# Patient Record
Sex: FEMALE | Race: WHITE | NOT HISPANIC OR LATINO | Employment: OTHER | URBAN - METROPOLITAN AREA
[De-identification: names, ages, dates, MRNs, and addresses within clinical notes are randomized per-mention and may not be internally consistent; named-entity substitution may affect disease eponyms.]

---

## 2019-05-20 NOTE — PATIENT DISCUSSION
New Prescription: prednisolone acetate (prednisolone acetate): drops,suspension: 1% 1 drop four times a day as directed 05-

## 2019-05-20 NOTE — PATIENT DISCUSSION
HX OF MAC HOLE OS - SOME RETINA THICKENING ON OCT TODAY. CALL AND GET CLEARANCE FROM DR. Rolando West. THE PT UNDERSTANDS THAT EVEN AFTER THE CATARACT IS REMOVED  THE VISION MAY ALWAYS BE LIMITED BY THE [place]. IF DR. ZIMMERMAN DOES NOT HAVE A RECENT VF   SCHEDULE A VISUAL FIELD NEXT VISIT TO CHECK FOR VISUAL FIELD LOSS.OCT NEXT VISIT TO CHECK FOR RNFL THINNING.

## 2019-05-20 NOTE — PATIENT DISCUSSION
EXOPTROPIA - ADV PT THAT HE WILL NEED TO BE RE-FIT FOR GLASSES WITH PRISMS TO CORRECT DEPTH PERCEPTION AFTER THE CATARACTS ARE REMOVED.

## 2019-05-20 NOTE — PATIENT DISCUSSION
I have discussed the options of monovision, multifocal and extended-depth-of-focus lens. It was explained that these options provide vision at near and distance; however, compromises to achieve vision at varying distances do exist and these compromises may result in a reduced quality of vision. It was explained to the patient that the visual performance and dependence on glasses varies from patient to patient and while others may perform activities without glasses there are some who need glasses for most or all of their activities. It was emphasized to the patient that monovision, multifocal and EDOF options do not eliminate the need for glasses at all times. It was emphasized to the patient that the goal of reducing spectacle dependence not spectacle freedom is more realistic. It was explained that they may and will possibly choose or need glasses to achieve optimal intermediate and binocular vision for distance and/or near activities.

## 2019-05-20 NOTE — PATIENT DISCUSSION
CATARACT OD - HOLD OFF ON SURGERY FOR NOW AND RE-EVALUATE LATER  IF VISUAL SYMPTOMS PERSIST.  GLS RX GIVEN TO FILL IF DESIRES IN THE EVENT PT DOES NOT PROCEED W/ SX.

## 2019-05-20 NOTE — PATIENT DISCUSSION
REFRACTIVE ERROR, OU - DISCUSSED OPTION OF CORRECTING AT THE TIME OF CATARACT SURGERY. PT UNDERSTANDS AND ELECTS TO CONSIDER THEIR OPTIONS. HE IS NOT A CANDIDATE FOR EDOF/MF IOLS, HE REQUIRES PRISM CORRECTION IN HIS GLASSES THAT CANNOT BE CORRECTED AT THE TIME OF CATARACT SURGERY.

## 2019-05-20 NOTE — PATIENT DISCUSSION
Monovision - Patient uses one eye to see at near and the other eye to see at distance. The patient has been a successful monovision patient or must demonstrate a successful mono-vision contact lens trial prior to cataract surgery. It was discussed that monovision is a compromise and vision will be limited during activities that require good depth perception to include driving at night and near tasks.

## 2019-05-20 NOTE — PATIENT DISCUSSION
Refractive Error - a problem focusing light accurately onto the retina due to the shape of the eye. The most common types of refractive error are near-sightedness; far-sightedness, astigmatism, and presbyopia. I have discussed the options for refractive surgery to decrease dependency on glasses and/or contact lenses after surgery. It was discussed that while the success rate of cataract surgery/ Refractive Lens Exchange (RLE) is high (approximately 99 percent), success being defined as removing a lens and replacing it with an artificial lens, the success rate of getting the prescription corrected to meet or approximate the patients goals (within 0.5 diopters) is lower than it is with LASIK (about 75-80%). These options include: correction of astigmatism with limbal relaxing incision(s), LenSx arcuate incision(s) and/or TORIC IOL, monovision, nanovision, multifocal IOL, and EDOF IOL.

## 2019-05-20 NOTE — PATIENT DISCUSSION
POAG OU - FOLLOWED BY DR. Lee Muñoz. REQUEST VISUAL FIELDS. POSSIBLE ISTENT. CONTINUE TIMOLOL 1 GTT QHS OU.

## 2019-05-20 NOTE — PATIENT DISCUSSION
It was discussed that the success rate of achieving their visual target with cataract surgery or a refractive lens exchange (RLE) is not as high as with LASIK and should the visual target not be achieved upon initial procedure, a glasses prescription and or a LASIK touch up maybe needed. The patient was advised that if monovision, a multifocal or extended-depth-of-focus lens does not meet his/her expectations and glasses provide an increased quality of vision, glasses would be prescribed and if a candidate, a LASIK touch up may be offered. However, if the lenses are limiting their vision or activities and the vision is neither correctable nor satisfactory with glasses, then an IOL exchange may be considered.

## 2019-05-20 NOTE — PATIENT DISCUSSION
"Multifocal versus Extended-depth-of-focus lens (EDOF) - It was explained that multifocal and extended depth of focus lenses split light and this can be associated with a decrease in contrast, a double image and ghosting which may or may not resolve over time. Multifocal lenses are lighting dependent. In low or dim lighting, glasses may be needed for optimal near vision. It was explained that rings, halos, starbursts or spider webs around point sources of light (headlights, tail lights, street lights, neon signs, the moon, stars, etc.) will be present indefinitely after multifocal/EDOF lens implantation. While the majority of patients do not find this limit's their night time activities to include driving, in rare instances, it can.  It was explained that these lenses are designed to satisfy a defined area of near vision, or ""sweet spot""

## 2019-05-20 NOTE — PATIENT DISCUSSION
Presbyopia is a gradual, age-related loss of the eye's ability to focus actively on nearby objects. The patient has expressed an interest in presbyopic correction.

## 2019-05-20 NOTE — PATIENT DISCUSSION
The patient was informed that the range of focus at near with mono-vision after cataract surgery will be smaller due to the smaller depth of field with an artificial lens when compared to their natural lens even in the setting of a cataract. It was also explained that the distance vision in the near eye may be blurrier when compared to the distance vision in the near eye with a natural lens. As such, a small percentage of successful monovision patients prior to surgery may experience difficulty with monovision with an artificial lens implant after surgery possibly requiring glasses and/or contacts or an IOL exchange.

## 2019-05-29 NOTE — PATIENT DISCUSSION
Continue: prednisolone acetate (prednisolone acetate): drops,suspension: 1% 1 drop four times a day as directed 05-

## 2019-07-19 NOTE — PATIENT DISCUSSION
PATIENT WILL BE SEEING DR WEBER I THE FUTURE. ADVISED IF ANY ISSUES WITH NEW GLASSES, RTC FOR RECHECK.   CONSIDER PRISM AND OR CATARACT SURGERY OD

## 2019-08-27 NOTE — PATIENT DISCUSSION
POAG OU - S/P iSTENT OS. FOLLOWED BY DR. Georgia Sánchez. REQUEST VISUAL FIELDS.  CONTINUE ON NO DROPS

## 2019-12-31 NOTE — PATIENT DISCUSSION
Selective Laser Trabeculoplasty Counseling: The patient's glaucoma diagnosis has been discussed and pathophysiology explained. Retinal nerve fiber analysis and visual fields reviewed along with IOP progression. R/B/A tx options discussed. The patient verbalizes understanding and wishes to proceed with SLT. Due to the risk of blurred vision and sensitivity after the procedure, a  is recommended. SLT will be scheduled at the patient's convenience.

## 2019-12-31 NOTE — PATIENT DISCUSSION
POAG OU - S/P iSTENT OS.  DISCUSSED VISUAL FIELDS WITH INFERIOR ARCUATE OS, ELEVATED IOP OD, RECOMMEND SLT OD THEN OS

## 2021-09-07 NOTE — PATIENT DISCUSSION
"0005:Pt is sitting up in bed, restless. Unable to communicate appropriately, is saying \"I want my mommy\" and \"ouch ouch.\" PRN Ativan and scheduled reglan administered at this time.   0015: Medications effective, pt lying in bed with extra blankets provided, calm and sleeping with unlabored breathing. Nasal canula removed by patient, maintaining O2 sat >94% at room air.   " STRABISMUS WITH ALTERNATING XT - NO DIPLOPIA ON CURRENT PRISM.  FOLLOW

## 2021-09-07 NOTE — PATIENT DISCUSSION
POAG OU - S/P iSTENT OS. PT NO LONGER SEEING DR ZIMMERMAN. IOP WNL CONTINUE ON NO DROPS. SCHEDULE VF PRIOR TO NEXT APPT.  RNFL AT F/U

## 2021-10-30 ENCOUNTER — NEW PATIENT (OUTPATIENT)
Dept: URBAN - METROPOLITAN AREA CLINIC 26 | Facility: CLINIC | Age: 66
End: 2021-10-30

## 2021-10-30 VITALS — HEIGHT: 68 IN | WEIGHT: 180 LBS | BODY MASS INDEX: 27.28 KG/M2

## 2021-10-30 DIAGNOSIS — H43.811: ICD-10-CM

## 2021-10-30 DIAGNOSIS — H43.391: ICD-10-CM

## 2021-10-30 DIAGNOSIS — H43.392: ICD-10-CM

## 2021-10-30 PROCEDURE — 92004 COMPRE OPH EXAM NEW PT 1/>: CPT

## 2021-10-30 PROCEDURE — 92250 FUNDUS PHOTOGRAPHY W/I&R: CPT

## 2021-10-30 ASSESSMENT — TONOMETRY
OD_IOP_MMHG: 14
OS_IOP_MMHG: 14

## 2021-10-30 ASSESSMENT — VISUAL ACUITY
OD_CC: 20/20
OS_CC: 20/20-2

## 2021-11-22 ENCOUNTER — FOLLOW UP (OUTPATIENT)
Dept: URBAN - METROPOLITAN AREA CLINIC 33 | Facility: CLINIC | Age: 66
End: 2021-11-22

## 2021-11-22 DIAGNOSIS — H43.391: ICD-10-CM

## 2021-11-22 DIAGNOSIS — H43.392: ICD-10-CM

## 2021-11-22 DIAGNOSIS — H43.811: ICD-10-CM

## 2021-11-22 PROCEDURE — 92012 INTRM OPH EXAM EST PATIENT: CPT

## 2021-11-22 PROCEDURE — 92250 FUNDUS PHOTOGRAPHY W/I&R: CPT

## 2021-11-30 ASSESSMENT — VISUAL ACUITY
OS_CC: 20/20-1
OD_CC: 20/20-

## 2021-11-30 ASSESSMENT — TONOMETRY
OS_IOP_MMHG: 14
OD_IOP_MMHG: 12

## 2022-03-10 NOTE — PATIENT DISCUSSION
POAG OU - S/P iSTENT OS. PT NO LONGER SEEING DR ZIMMERMAN. IOP WNL CONTINUE ON NO DROPS. SCHEDULE VF PRIOR TO NEXT APPT. RNFL AT F/U.

## 2022-05-03 NOTE — PATIENT DISCUSSION
S/P iSTENT OS. PT NO LONGER SEEING DR ZIMMERMAN. IOP WNL CONTINUE ON NO DROPS. Reviewed RNFL and VF today.